# Patient Record
Sex: FEMALE | Race: WHITE | Employment: FULL TIME | URBAN - METROPOLITAN AREA
[De-identification: names, ages, dates, MRNs, and addresses within clinical notes are randomized per-mention and may not be internally consistent; named-entity substitution may affect disease eponyms.]

---

## 2014-07-02 LAB — HIV AG/AB: NONREACTIVE

## 2017-09-11 ENCOUNTER — OFFICE VISIT (OUTPATIENT)
Dept: FAMILY MEDICINE CLINIC | Age: 37
End: 2017-09-11
Payer: COMMERCIAL

## 2017-09-11 VITALS
HEIGHT: 68 IN | HEART RATE: 89 BPM | TEMPERATURE: 97.3 F | BODY MASS INDEX: 42.01 KG/M2 | WEIGHT: 277.2 LBS | DIASTOLIC BLOOD PRESSURE: 76 MMHG | OXYGEN SATURATION: 98 % | SYSTOLIC BLOOD PRESSURE: 110 MMHG

## 2017-09-11 DIAGNOSIS — M62.830 BACK MUSCLE SPASM: Primary | ICD-10-CM

## 2017-09-11 PROCEDURE — G8417 CALC BMI ABV UP PARAM F/U: HCPCS | Performed by: NURSE PRACTITIONER

## 2017-09-11 PROCEDURE — 99213 OFFICE O/P EST LOW 20 MIN: CPT | Performed by: NURSE PRACTITIONER

## 2017-09-11 RX ORDER — CYCLOBENZAPRINE HCL 10 MG
10 TABLET ORAL EVERY 8 HOURS PRN
Qty: 30 TABLET | Refills: 1 | Status: SHIPPED | OUTPATIENT
Start: 2017-09-11 | End: 2017-09-21

## 2017-09-11 ASSESSMENT — ENCOUNTER SYMPTOMS
BOWEL INCONTINENCE: 0
ABDOMINAL PAIN: 0
SHORTNESS OF BREATH: 0
BACK PAIN: 1

## 2017-09-11 ASSESSMENT — PATIENT HEALTH QUESTIONNAIRE - PHQ9
2. FEELING DOWN, DEPRESSED OR HOPELESS: 0
1. LITTLE INTEREST OR PLEASURE IN DOING THINGS: 0
SUM OF ALL RESPONSES TO PHQ9 QUESTIONS 1 & 2: 0
SUM OF ALL RESPONSES TO PHQ QUESTIONS 1-9: 0

## 2017-09-29 ENCOUNTER — OFFICE VISIT (OUTPATIENT)
Dept: FAMILY MEDICINE CLINIC | Age: 37
End: 2017-09-29
Payer: COMMERCIAL

## 2017-09-29 ENCOUNTER — TELEPHONE (OUTPATIENT)
Dept: FAMILY MEDICINE CLINIC | Age: 37
End: 2017-09-29

## 2017-09-29 VITALS
WEIGHT: 277.12 LBS | DIASTOLIC BLOOD PRESSURE: 64 MMHG | SYSTOLIC BLOOD PRESSURE: 120 MMHG | TEMPERATURE: 98.1 F | OXYGEN SATURATION: 98 % | HEART RATE: 96 BPM | HEIGHT: 68 IN | RESPIRATION RATE: 18 BRPM | BODY MASS INDEX: 42 KG/M2

## 2017-09-29 DIAGNOSIS — B96.89 ACUTE BACTERIAL SINUSITIS: Primary | ICD-10-CM

## 2017-09-29 DIAGNOSIS — J01.90 ACUTE BACTERIAL SINUSITIS: Primary | ICD-10-CM

## 2017-09-29 PROCEDURE — 99214 OFFICE O/P EST MOD 30 MIN: CPT | Performed by: NURSE PRACTITIONER

## 2017-09-29 RX ORDER — AMOXICILLIN AND CLAVULANATE POTASSIUM 875; 125 MG/1; MG/1
1 TABLET, FILM COATED ORAL 2 TIMES DAILY
Qty: 20 TABLET | Refills: 0 | Status: SHIPPED | OUTPATIENT
Start: 2017-09-29 | End: 2017-10-09

## 2017-09-29 ASSESSMENT — ENCOUNTER SYMPTOMS
SHORTNESS OF BREATH: 0
COUGH: 1
VOICE CHANGE: 0
EYE REDNESS: 0
SINUS PRESSURE: 0
EYE DISCHARGE: 0
CHEST TIGHTNESS: 0
SORE THROAT: 1
WHEEZING: 0

## 2017-12-08 RX ORDER — FLUTICASONE PROPIONATE 50 MCG
1 SPRAY, SUSPENSION (ML) NASAL DAILY
COMMUNITY
End: 2017-12-08 | Stop reason: SDUPTHER

## 2017-12-08 RX ORDER — FLUTICASONE PROPIONATE 50 MCG
1 SPRAY, SUSPENSION (ML) NASAL DAILY
Qty: 1 BOTTLE | Refills: 2 | Status: SHIPPED | OUTPATIENT
Start: 2017-12-08

## 2018-03-06 ENCOUNTER — OFFICE VISIT (OUTPATIENT)
Dept: FAMILY MEDICINE CLINIC | Age: 38
End: 2018-03-06
Payer: COMMERCIAL

## 2018-03-06 VITALS
TEMPERATURE: 97.6 F | SYSTOLIC BLOOD PRESSURE: 115 MMHG | HEIGHT: 68 IN | DIASTOLIC BLOOD PRESSURE: 83 MMHG | BODY MASS INDEX: 42.89 KG/M2 | HEART RATE: 85 BPM | WEIGHT: 283 LBS | OXYGEN SATURATION: 98 %

## 2018-03-06 DIAGNOSIS — D17.1 LIPOMA OF BACK: Primary | ICD-10-CM

## 2018-03-06 PROCEDURE — 99212 OFFICE O/P EST SF 10 MIN: CPT | Performed by: NURSE PRACTITIONER

## 2018-03-06 ASSESSMENT — ENCOUNTER SYMPTOMS
NAUSEA: 0
BACK PAIN: 1
COLOR CHANGE: 0
VOMITING: 0
COUGH: 0
SHORTNESS OF BREATH: 0

## 2018-03-06 NOTE — PROGRESS NOTES
Visit Information    Have you changed or started any medications since your last visit including any over-the-counter medicines, vitamins, or herbal medicines? no   Have you stopped taking any of your medications? Is so, why? -  no  Are you having any side effects from any of your medications? - no    Have you seen any other physician or provider since your last visit?  no   Have you had any other diagnostic tests since your last visit?  no   Have you been seen in the emergency room and/or had an admission in a hospital since we last saw you?  no   Have you had your routine dental cleaning in the past 6 months?  no     Do you have an active MyChart account? If no, what is the barrier?   Yes    Patient Care Team:  Eriberto Jang CNP as PCP - General (Nurse Practitioner)    Medical History Review  Past Medical, Family, and Social History reviewed and  contribute to the patient presenting condition    Health Maintenance   Topic Date Due    HIV screen  03/29/1996    Flu vaccine (1) 09/13/2018 (Originally 9/1/2017)    DTaP/Tdap/Td vaccine (1 - Tdap) 09/21/2023 (Originally 3/29/2000)    Cervical cancer screen  06/19/2018
myalgias, neck pain and neck stiffness. Skin: Negative for color change, pallor and wound. Neurological: Negative for dizziness, weakness, light-headedness and headaches. Psychiatric/Behavioral: Negative for sleep disturbance. Objective:     Physical Exam   Constitutional: She is oriented to person, place, and time. She appears well-developed and well-nourished. No distress. HENT:   Head: Normocephalic and atraumatic. Neck: Neck supple. Musculoskeletal:        Arms:  Neurological: She is alert and oriented to person, place, and time. Skin: Skin is warm and dry. No erythema. Mobile lipoma to left middle back, no tenderness, erythema or surrounding swelling   Psychiatric: She has a normal mood and affect. Her behavior is normal. Judgment and thought content normal.   Nursing note and vitals reviewed. Assessment:      1. Lipoma of back         Plan:      Return in about 2 weeks (around 3/20/2018) for PAP. Orders Placed This Encounter   Procedures    HIV Screen     This order was created through External Result Entry     Fu for pap  Monitor lipoma and call if pain develops or swelling, redness    Patient given educational materials - see patient instructions. Discussed use, benefit, and side effects of prescribed medications. All patient questions answered. Pt voiced understanding. Reviewed health maintenance. Instructed to continue current medications, diet and exercise.     Electronically signed by Winston Helms CNP on 3/6/2018 at 1:43 PM

## 2018-03-20 ENCOUNTER — HOSPITAL ENCOUNTER (OUTPATIENT)
Age: 38
Setting detail: SPECIMEN
Discharge: HOME | End: 2018-03-20
Payer: COMMERCIAL

## 2018-03-20 ENCOUNTER — OFFICE VISIT (OUTPATIENT)
Dept: FAMILY MEDICINE CLINIC | Age: 38
End: 2018-03-20
Payer: COMMERCIAL

## 2018-03-20 VITALS
HEART RATE: 80 BPM | BODY MASS INDEX: 43.65 KG/M2 | WEIGHT: 288 LBS | SYSTOLIC BLOOD PRESSURE: 120 MMHG | OXYGEN SATURATION: 98 % | HEIGHT: 68 IN | DIASTOLIC BLOOD PRESSURE: 82 MMHG | TEMPERATURE: 97.9 F

## 2018-03-20 DIAGNOSIS — N89.8 VAGINAL IRRITATION: ICD-10-CM

## 2018-03-20 DIAGNOSIS — Z12.4 CERVICAL CANCER SCREENING: ICD-10-CM

## 2018-03-20 DIAGNOSIS — Z12.4 ROUTINE PAPANICOLAOU SMEAR: Primary | ICD-10-CM

## 2018-03-20 LAB
DIRECT EXAM: ABNORMAL
Lab: ABNORMAL
SPECIMEN DESCRIPTION: ABNORMAL
STATUS: ABNORMAL

## 2018-03-20 PROCEDURE — 99395 PREV VISIT EST AGE 18-39: CPT | Performed by: NURSE PRACTITIONER

## 2018-03-20 ASSESSMENT — PATIENT HEALTH QUESTIONNAIRE - PHQ9
SUM OF ALL RESPONSES TO PHQ QUESTIONS 1-9: 0
2. FEELING DOWN, DEPRESSED OR HOPELESS: 0
1. LITTLE INTEREST OR PLEASURE IN DOING THINGS: 0
SUM OF ALL RESPONSES TO PHQ9 QUESTIONS 1 & 2: 0

## 2018-03-20 NOTE — PROGRESS NOTES
and atraumatic  Neck: supple and non-tender without mass, no thyromegaly or thyroid nodules, no cervical lymphadenopathy  Pulmonary/Chest: clear to auscultation bilaterally- no wheezes, rales or rhonchi, normal air movement, no respiratory distress  Cardiovascular: normal rate, regular rhythm, normal S1 and S2, no murmurs, rubs, clicks, or gallops  Abdomen: soft, non-tender, non-distended, normal bowel sounds, no masses or organomegaly  Pelvic: normal external genitalia, vulva, vagina, cervix, uterus and adnexa. No CMT. no lesions. White clumpy vaginal discharge. No masses noted. Breast: appear normal, no suspicious masses, no skin or nipple changes or axillary nodes bilaterally. Psych: pleasant, cooperative          Assessment:    annual pap exam  1. Routine Papanicolaou smear  PAP Smear   2. Cervical cancer screening  PAP Smear       Plan:      Return in about 1 year (around 3/20/2019) for routine healthcare visit. Orders Placed This Encounter   Procedures    PAP Smear     Patient History:    Patient's last menstrual period was 2018 (approximate). OBGYN Status: Having periods  Past Surgical History:  No date:  SECTION      Comment: x2  No date: OTHER SURGICAL HISTORY      Comment: mole removal      Smoking status: Never Smoker                                                              Smokeless tobacco: Never Used                           Standing Status:   Future     Standing Expiration Date:   3/20/2019     Order Specific Question:   Collection Type     Answer: Thin Prep     Order Specific Question:   Prior Abnormal Pap Test     Answer:   No     Order Specific Question:   Screening or Diagnostic     Answer:   Screening     Order Specific Question:   HPV Requested? Answer:   Yes     Order Specific Question:   High Risk Patient     Answer:   N/A     Declines mamm  Will call with test results  The nature of sun-induced photo-aging and skin cancers is discussed.   Sun avoidance, protective clothing, and the use of 30-SPF sunscreens is advised. Observe closely for skin damage/changes, and call if such occurs. Patient given educational materials - see patient instructions. Discussed use, benefit, and side effects of prescribed medications. All patient questions answered. Pt voiced understanding. Reviewed health maintenance. Instructed to continue current medications, diet and exercise. Patient agreed with treatment plan. Follow up as directed below.      Electronically signed by RIP Romero on 3/20/2018 at 9:42 AM

## 2018-03-21 DIAGNOSIS — N76.0 BV (BACTERIAL VAGINOSIS): Primary | ICD-10-CM

## 2018-03-21 DIAGNOSIS — B96.89 BV (BACTERIAL VAGINOSIS): Primary | ICD-10-CM

## 2018-03-21 LAB
HPV SAMPLE: NORMAL
HPV SOURCE: NORMAL
HPV, GENOTYPE 16: NOT DETECTED
HPV, GENOTYPE 18: NOT DETECTED
HPV, HIGH RISK OTHER: NOT DETECTED
HPV, INTERPRETATION: NORMAL

## 2018-03-21 RX ORDER — METRONIDAZOLE 500 MG/1
500 TABLET ORAL 2 TIMES DAILY
Qty: 14 TABLET | Refills: 0 | Status: SHIPPED | OUTPATIENT
Start: 2018-03-21 | End: 2018-03-28

## 2018-03-27 ENCOUNTER — TELEPHONE (OUTPATIENT)
Dept: FAMILY MEDICINE CLINIC | Age: 38
End: 2018-03-27

## 2018-04-03 LAB — CYTOLOGY REPORT: NORMAL

## 2018-11-28 ENCOUNTER — OFFICE VISIT (OUTPATIENT)
Dept: FAMILY MEDICINE CLINIC | Age: 38
End: 2018-11-28
Payer: COMMERCIAL

## 2018-11-28 VITALS
DIASTOLIC BLOOD PRESSURE: 82 MMHG | WEIGHT: 292 LBS | HEIGHT: 68 IN | BODY MASS INDEX: 44.25 KG/M2 | TEMPERATURE: 97.9 F | HEART RATE: 88 BPM | SYSTOLIC BLOOD PRESSURE: 117 MMHG | OXYGEN SATURATION: 98 %

## 2018-11-28 DIAGNOSIS — Z91.09 FRAGRANCE HYPERSENSITIVITY: ICD-10-CM

## 2018-11-28 DIAGNOSIS — R07.89 CHEST TIGHTNESS: ICD-10-CM

## 2018-11-28 DIAGNOSIS — Z23 NEED FOR INFLUENZA VACCINATION: ICD-10-CM

## 2018-11-28 DIAGNOSIS — R09.89 THROAT TIGHTNESS: Primary | ICD-10-CM

## 2018-11-28 PROCEDURE — 90472 IMMUNIZATION ADMIN EACH ADD: CPT | Performed by: NURSE PRACTITIONER

## 2018-11-28 PROCEDURE — 90688 IIV4 VACCINE SPLT 0.5 ML IM: CPT | Performed by: NURSE PRACTITIONER

## 2018-11-28 PROCEDURE — 90471 IMMUNIZATION ADMIN: CPT | Performed by: NURSE PRACTITIONER

## 2018-11-28 PROCEDURE — 99213 OFFICE O/P EST LOW 20 MIN: CPT | Performed by: NURSE PRACTITIONER

## 2018-11-28 RX ORDER — ALBUTEROL SULFATE 90 UG/1
1-2 AEROSOL, METERED RESPIRATORY (INHALATION) 4 TIMES DAILY PRN
Qty: 1 INHALER | Refills: 0 | Status: SHIPPED | OUTPATIENT
Start: 2018-11-28 | End: 2019-07-20 | Stop reason: SDUPTHER

## 2018-11-28 ASSESSMENT — ENCOUNTER SYMPTOMS
RHINORRHEA: 0
SINUS PRESSURE: 0
TROUBLE SWALLOWING: 1
SORE THROAT: 0
CHEST TIGHTNESS: 1
COUGH: 0
ABDOMINAL PAIN: 0
SHORTNESS OF BREATH: 1

## 2018-11-28 NOTE — PROGRESS NOTES
maxillary sinus tenderness and no frontal sinus tenderness. Left sinus exhibits no maxillary sinus tenderness and no frontal sinus tenderness. Mouth/Throat: Uvula is midline, oropharynx is clear and moist and mucous membranes are normal. No oropharyngeal exudate. Neck: Normal range of motion. Neck supple. Cardiovascular: Normal rate, regular rhythm and normal heart sounds. Pulmonary/Chest: Effort normal and breath sounds normal. No respiratory distress. She has no wheezes. Lymphadenopathy:     She has no cervical adenopathy. Neurological: She is alert and oriented to person, place, and time. Skin: Skin is warm and dry. Capillary refill takes less than 2 seconds. No rash noted. She is not diaphoretic. Psychiatric: She has a normal mood and affect. Her behavior is normal. Judgment and thought content normal.   Nursing note and vitals reviewed. Assessment:       Diagnosis Orders   1. Throat tightness     2. Chest tightness  albuterol sulfate  (90 Base) MCG/ACT inhaler   3. Fragrance hypersensitivity  albuterol sulfate  (90 Base) MCG/ACT inhaler   4. Need for influenza vaccination  INFLUENZA, QUADV, 3 YRS AND OLDER, IM, MDV, 0.5ML ([place])       Plan:      Return if symptoms worsen or fail to improve. Orders Placed This Encounter   Procedures    INFLUENZA, QUADV, 3 YRS AND OLDER, IM, MDV, 0.5ML (FLUZONE QUADV)     Orders Placed This Encounter   Medications    albuterol sulfate  (90 Base) MCG/ACT inhaler     Sig: Inhale 1-2 puffs into the lungs 4 times daily as needed for Shortness of Breath     Dispense:  1 Inhaler     Refill:  0     Discussed all tx options IE, steroid inhaler, change of allergy med, albuterol inhaler, singulair and pt decided to trial albuterol first since it is as needed and can use more than daily if needed  She will seek allergist appt after new years. Flu vaccine given    Patient given educational materials - see patient instructions.

## 2019-03-11 ENCOUNTER — TELEPHONE (OUTPATIENT)
Dept: FAMILY MEDICINE CLINIC | Age: 39
End: 2019-03-11

## 2019-06-17 ENCOUNTER — OFFICE VISIT (OUTPATIENT)
Dept: FAMILY MEDICINE CLINIC | Age: 39
End: 2019-06-17
Payer: COMMERCIAL

## 2019-06-17 VITALS
SYSTOLIC BLOOD PRESSURE: 106 MMHG | HEART RATE: 91 BPM | DIASTOLIC BLOOD PRESSURE: 76 MMHG | TEMPERATURE: 98.7 F | OXYGEN SATURATION: 98 %

## 2019-06-17 DIAGNOSIS — J06.9 VIRAL URI: Primary | ICD-10-CM

## 2019-06-17 PROCEDURE — 99213 OFFICE O/P EST LOW 20 MIN: CPT | Performed by: NURSE PRACTITIONER

## 2019-06-17 RX ORDER — AMOXICILLIN 875 MG/1
875 TABLET, COATED ORAL 2 TIMES DAILY
Qty: 20 TABLET | Refills: 0 | Status: SHIPPED | OUTPATIENT
Start: 2019-06-17 | End: 2019-06-27

## 2019-06-17 ASSESSMENT — ENCOUNTER SYMPTOMS
VOMITING: 0
RHINORRHEA: 1
VOICE CHANGE: 1
SORE THROAT: 1
DIARRHEA: 0
COUGH: 1

## 2019-06-17 ASSESSMENT — PATIENT HEALTH QUESTIONNAIRE - PHQ9
2. FEELING DOWN, DEPRESSED OR HOPELESS: 0
SUM OF ALL RESPONSES TO PHQ QUESTIONS 1-9: 0
SUM OF ALL RESPONSES TO PHQ QUESTIONS 1-9: 0
1. LITTLE INTEREST OR PLEASURE IN DOING THINGS: 0
SUM OF ALL RESPONSES TO PHQ9 QUESTIONS 1 & 2: 0

## 2019-06-17 NOTE — PATIENT INSTRUCTIONS
Good handwashing  Increase fluids  Continue flonase  Add claritin or zyrtec  Fill amoxicillin if not better or if worse later in week  Gargle warm salt water  Motrin every 8 hours as directed   Tylenol every 6 hours as directed  Return for worsening, change or concern  Follow up as directed  Patient Education        Upper Respiratory Infection (Cold): Care Instructions  Your Care Instructions    An upper respiratory infection, or URI, is an infection of the nose, sinuses, or throat. URIs are spread by coughs, sneezes, and direct contact. The common cold is the most frequent kind of URI. The flu and sinus infections are other kinds of URIs. Almost all URIs are caused by viruses. Antibiotics won't cure them. But you can treat most infections with home care. This may include drinking lots of fluids and taking over-the-counter pain medicine. You will probably feel better in 4 to 10 days. The doctor has checked you carefully, but problems can develop later. If you notice any problems or new symptoms, get medical treatment right away. Follow-up care is a key part of your treatment and safety. Be sure to make and go to all appointments, and call your doctor if you are having problems. It's also a good idea to know your test results and keep a list of the medicines you take. How can you care for yourself at home? · To prevent dehydration, drink plenty of fluids, enough so that your urine is light yellow or clear like water. Choose water and other caffeine-free clear liquids until you feel better. If you have kidney, heart, or liver disease and have to limit fluids, talk with your doctor before you increase the amount of fluids you drink. · Take an over-the-counter pain medicine, such as acetaminophen (Tylenol), ibuprofen (Advil, Motrin), or naproxen (Aleve). Read and follow all instructions on the label. · Before you use cough and cold medicines, check the label.  These medicines may not be safe for young children or

## 2019-06-17 NOTE — PROGRESS NOTES
[address] [facility] [address]  Dept: [phone]  Dept Fax: [phone]    Brandt Sargent a 46 y.o. female who presents to the urgent care today for her medical conditions/complaintsas noted below. Laureen Granda is c/o of Pharyngitis (ears feels clogged, post nasal drip - started 2 days ago)      HPI:     Cough 2 days, nasal congestion, sore throat from drainage  In musical this weekend  Son has uri  States drainage Is affecting her voice    URI    This is a new problem. Episode onset: 2 days. The problem has been unchanged. There has been no fever. Associated symptoms include congestion, coughing, rhinorrhea and a sore throat. Pertinent negatives include no diarrhea or vomiting. Treatments tried: mucinex and flonase. The treatment provided mild relief. Past Medical History:   Diagnosis Date    Back muscle spasm 9/11/2017    BV (bacterial vaginosis) 3/21/2018       Current Outpatient Medications   Medication Sig Dispense Refill    amoxicillin (AMOXIL) 875 MG tablet Take 1 tablet by mouth 2 times daily for 10 days 20 tablet 0    albuterol sulfate  (90 Base) MCG/ACT inhaler Inhale 1-2 puffs into the lungs 4 times daily as needed for Shortness of Breath 1 Inhaler 0    fluticasone (FLONASE) 50 MCG/ACT nasal spray 1 spray by Nasal route daily 1 Bottle 2     No current facility-administered medications for this visit. Allergies   Allergen Reactions    Flagyl [Metronidazole]        Subjective:     Review of Systems   Constitutional: Negative for fever. HENT: Positive for congestion, rhinorrhea, sore throat and voice change. Respiratory: Positive for cough. Gastrointestinal: Negative for diarrhea and vomiting. All other systems reviewed and are negative. Objective:      Physical Exam   Constitutional: She is oriented to person, place, and time.  Vital signs are normal. She appears well-developed and well-nourished. No distress. HENT:   Head: Normocephalic and atraumatic. Right Ear: Tympanic membrane normal.   Left Ear: Tympanic membrane normal.   Nose: Mucosal edema present. Mouth/Throat: Uvula is midline and mucous membranes are normal. No trismus in the jaw. No uvula swelling. Posterior oropharyngeal erythema present. No oropharyngeal exudate. Postnasal drainage in throat   Eyes: Pupils are equal, round, and reactive to light. Conjunctivae are normal. Right eye exhibits no discharge. Left eye exhibits no discharge. No scleral icterus. Neck: Normal range of motion. Neck supple. Cardiovascular: Normal rate, regular rhythm and normal heart sounds. No murmur heard. Pulmonary/Chest: Effort normal and breath sounds normal. No stridor. No respiratory distress. She has no wheezes. She has no rales. Abdominal: Soft. Bowel sounds are normal. There is no tenderness. Musculoskeletal: Normal range of motion. She exhibits no edema. No edema. Normal gait in pham   Lymphadenopathy:     She has no cervical adenopathy. Neurological: She is alert and oriented to person, place, and time. No cranial nerve deficit. Skin: Skin is warm and dry. No rash noted. She is not diaphoretic. Psychiatric: She has a normal mood and affect. Her behavior is normal.   Nursing note and vitals reviewed. /76 (Site: Left Upper Arm, Position: Sitting, Cuff Size: Large Adult)   Pulse 91   Temp 98.7 °F (37.1 °C) (Oral)   LMP 06/14/2019 (Exact Date)   SpO2 98%     Assessment:          Diagnosis Orders   1.  Viral URI  amoxicillin (AMOXIL) 875 MG tablet       Plan:    Good handwashing  Increase fluids  Continue flonase  Add claritin or zyrtec  Fill amoxicillin if not better or if worse later in week  Gargle warm salt water  Motrin every 8 hours as directed   Tylenol every 6 hours as directed  Return for worsening, change or concern  Follow up as directed  Return for follow up with primary care in 7 days, worsening, change or concern. Orders Placed This Encounter   Medications    amoxicillin (AMOXIL) 875 MG tablet     Sig: Take 1 tablet by mouth 2 times daily for 10 days     Dispense:  20 tablet     Refill:  0         Patient given educational materials - see patientinstructions. Discussed use, benefit, and side effects of prescribed medications. All patient questions answered. Pt voiced understanding.     Electronically signed by SUMMER Frye 6/17/2019 at 10:01 AM

## 2019-07-20 DIAGNOSIS — R07.89 CHEST TIGHTNESS: ICD-10-CM

## 2019-07-20 DIAGNOSIS — Z91.09 FRAGRANCE HYPERSENSITIVITY: ICD-10-CM

## 2019-07-21 RX ORDER — ALBUTEROL SULFATE 90 UG/1
1-2 AEROSOL, METERED RESPIRATORY (INHALATION) 4 TIMES DAILY PRN
Qty: 1 INHALER | Refills: 0 | Status: SHIPPED | OUTPATIENT
Start: 2019-07-21

## 2019-12-23 ENCOUNTER — OFFICE VISIT (OUTPATIENT)
Dept: FAMILY MEDICINE CLINIC | Age: 39
End: 2019-12-23
Payer: COMMERCIAL

## 2019-12-23 VITALS
SYSTOLIC BLOOD PRESSURE: 129 MMHG | TEMPERATURE: 98 F | HEIGHT: 68 IN | DIASTOLIC BLOOD PRESSURE: 84 MMHG | BODY MASS INDEX: 44.4 KG/M2 | HEART RATE: 93 BPM | OXYGEN SATURATION: 97 %

## 2019-12-23 DIAGNOSIS — M79.671 RIGHT FOOT PAIN: Primary | ICD-10-CM

## 2019-12-23 PROCEDURE — 99214 OFFICE O/P EST MOD 30 MIN: CPT | Performed by: NURSE PRACTITIONER

## 2019-12-23 ASSESSMENT — ENCOUNTER SYMPTOMS
COUGH: 0
COLOR CHANGE: 0
SHORTNESS OF BREATH: 0
NAUSEA: 0
VOMITING: 0

## 2020-09-28 ENCOUNTER — PATIENT MESSAGE (OUTPATIENT)
Dept: FAMILY MEDICINE CLINIC | Age: 40
End: 2020-09-28

## 2020-09-28 ENCOUNTER — OFFICE VISIT (OUTPATIENT)
Dept: FAMILY MEDICINE CLINIC | Age: 40
End: 2020-09-28
Payer: COMMERCIAL

## 2020-09-28 VITALS
HEIGHT: 68 IN | SYSTOLIC BLOOD PRESSURE: 116 MMHG | HEART RATE: 88 BPM | TEMPERATURE: 97.9 F | DIASTOLIC BLOOD PRESSURE: 84 MMHG | WEIGHT: 293 LBS | BODY MASS INDEX: 44.41 KG/M2 | OXYGEN SATURATION: 98 %

## 2020-09-28 PROCEDURE — 90471 IMMUNIZATION ADMIN: CPT | Performed by: NURSE PRACTITIONER

## 2020-09-28 PROCEDURE — 99214 OFFICE O/P EST MOD 30 MIN: CPT | Performed by: NURSE PRACTITIONER

## 2020-09-28 PROCEDURE — 90686 IIV4 VACC NO PRSV 0.5 ML IM: CPT | Performed by: NURSE PRACTITIONER

## 2020-09-28 PROCEDURE — G8417 CALC BMI ABV UP PARAM F/U: HCPCS | Performed by: NURSE PRACTITIONER

## 2020-09-28 RX ORDER — ALPRAZOLAM 0.5 MG/1
0.5 TABLET ORAL 3 TIMES DAILY PRN
Qty: 30 TABLET | Refills: 0 | Status: SHIPPED | OUTPATIENT
Start: 2020-09-28 | End: 2020-10-28

## 2020-09-28 RX ORDER — DEXAMETHASONE 0.5 MG/1
1 TABLET ORAL ONCE
Qty: 2 TABLET | Refills: 0 | Status: SHIPPED | OUTPATIENT
Start: 2020-09-28 | End: 2020-09-28

## 2020-09-28 ASSESSMENT — ENCOUNTER SYMPTOMS
VOMITING: 0
SHORTNESS OF BREATH: 0
CHEST TIGHTNESS: 0
ABDOMINAL PAIN: 0
COUGH: 0
NAUSEA: 0

## 2020-09-28 ASSESSMENT — PATIENT HEALTH QUESTIONNAIRE - PHQ9
1. LITTLE INTEREST OR PLEASURE IN DOING THINGS: 0
SUM OF ALL RESPONSES TO PHQ QUESTIONS 1-9: 0
SUM OF ALL RESPONSES TO PHQ QUESTIONS 1-9: 0
2. FEELING DOWN, DEPRESSED OR HOPELESS: 0
SUM OF ALL RESPONSES TO PHQ9 QUESTIONS 1 & 2: 0

## 2020-09-28 NOTE — PROGRESS NOTES
Inhale 1-2 puffs into the lungs 4 times daily as needed for Shortness of Breath 1 Inhaler 0     No current facility-administered medications for this visit. Allergies   Allergen Reactions    Flagyl [Metronidazole]        Health Maintenance   Topic Date Due    Varicella vaccine (1 of 2 - 2-dose childhood series) 03/29/1982    Flu vaccine (1) 09/01/2020    Cervical cancer screen  03/20/2023    DTaP/Tdap/Td vaccine (2 - Td) 07/02/2024    HIV screen  Completed    Hepatitis A vaccine  Aged Out    Hepatitis B vaccine  Aged Out    Hib vaccine  Aged Out    Meningococcal (ACWY) vaccine  Aged Out    Pneumococcal 0-64 years Vaccine  Aged Out       Subjective:      Review of Systems   Constitutional: Positive for unexpected weight change. Negative for appetite change, chills, diaphoresis, fatigue and fever. Eyes: Negative for visual disturbance. Respiratory: Negative for cough, chest tightness and shortness of breath. Cardiovascular: Negative for chest pain, palpitations and leg swelling. Gastrointestinal: Negative for abdominal pain, nausea and vomiting. Endocrine: Negative for cold intolerance, heat intolerance, polydipsia, polyphagia and polyuria. Genitourinary: Negative for menstrual problem. Musculoskeletal: Negative for arthralgias and myalgias. Skin: Negative for rash. Neurological: Negative for dizziness, weakness, light-headedness and headaches. Hematological: Negative for adenopathy. Psychiatric/Behavioral: Positive for sleep disturbance. Negative for behavioral problems, confusion, decreased concentration and dysphoric mood. The patient is nervous/anxious. Objective:      Physical Exam  Vitals signs and nursing note reviewed. Constitutional:       General: She is not in acute distress. Appearance: Normal appearance. She is well-developed. She is obese. She is not ill-appearing or diaphoretic. HENT:      Head: Normocephalic and atraumatic.    Neck: Musculoskeletal: Neck supple. Cardiovascular:      Rate and Rhythm: Normal rate and regular rhythm. Heart sounds: Normal heart sounds. Comments: No LE edema  Pulmonary:      Effort: Pulmonary effort is normal.      Breath sounds: Normal breath sounds. Skin:     General: Skin is warm and dry. Coloration: Skin is not pale. Findings: No erythema or rash. Neurological:      General: No focal deficit present. Mental Status: She is alert and oriented to person, place, and time. Motor: No weakness. Gait: Gait normal.   Psychiatric:         Mood and Affect: Mood normal.         Behavior: Behavior normal.         Thought Content: Thought content normal.         Judgment: Judgment normal.         Assessment:       Diagnosis Orders   1. Situational anxiety  DEXAMETHASONE    Cortisol Total    dexamethasone (DECADRON) 0.5 MG tablet    ALPRAZolam (XANAX) 0.5 MG tablet   2. Flu vaccine need  INFLUENZA, QUADV, 3 YRS AND OLDER, IM PF, PREFILL SYR OR SDV, 0.5ML (AFLURIA QUADV, PF)   3. Abnormal weight gain  TSH without Reflex    T4, Free    Thyroid Antibodies    Vitamin D 25 Hydroxy    DEXAMETHASONE    Comprehensive Metabolic Panel    CBC Auto Differential    dexamethasone (DECADRON) 0.5 MG tablet   4. Morbid obesity with BMI of 45.0-49.9, adult (HCC)  TSH without Reflex    T4, Free    Thyroid Antibodies    Vitamin D 25 Hydroxy    DEXAMETHASONE    Cortisol Total    dexamethasone (DECADRON) 0.5 MG tablet    AL CALC BMI ABV UP DENISSE F/U     Wt Readings from Last 3 Encounters:   09/28/20 (!) 310 lb 9.6 oz (140.9 kg)   11/28/18 292 lb (132.5 kg)   03/20/18 288 lb (130.6 kg)     BP Readings from Last 3 Encounters:   09/28/20 116/84   12/23/19 129/84   06/17/19 106/76         Plan:      Return if symptoms worsen or fail to improve.   Orders Placed This Encounter   Procedures    INFLUENZA, QUADV, 3 YRS AND OLDER, IM PF, PREFILL SYR OR SDV, 0.5ML (AFLURIA QUADV, PF)    TSH without Reflex     Standing damage/changes, and call if such occurs. Denies sleep apnea symptoms    Patient given educational materials - see patient instructions. Discussed use,benefit, and side effects of prescribed medications. All patient questions answered. Pt voiced understanding. Reviewed health maintenance. Instructed to continue currentmedications, diet and exercise.     Electronically signed by Renetta Helms CNP on 9/28/2020 at 12:16 PM

## 2020-09-28 NOTE — PROGRESS NOTES
Visit Information    Have you changed or started any medications since your last visit including any over-the-counter medicines, vitamins, or herbal medicines? no   Have you stopped taking any of your medications? Is so, why? -  no  Are you having any side effects from any of your medications? - no    Have you seen any other physician or provider since your last visit?  no   Have you had any other diagnostic tests since your last visit?  no   Have you been seen in the emergency room and/or had an admission in a hospital since we last saw you?  no   Have you had your routine dental cleaning in the past 6 months?  no     Do you have an active MyChart account? If no, what is the barrier? Yes    Patient Care Team:  SUMMER Hanson CNP as PCP - General (Nurse Practitioner)  SUMMER Hanson CNP [facility] PCP [facility]    Medical History Review  Past Medical, Family, and Social History reviewed and  contribute to the patient presenting condition    Health Maintenance   Topic Date Due    Varicella vaccine (1 of 2 - 2-dose childhood series) 03/29/1982    Flu vaccine (1) 09/01/2020    Cervical cancer screen  03/20/2023    DTaP/Tdap/Td vaccine (2 - Td) 07/02/2024    HIV screen  Completed    Hepatitis A vaccine  Aged Out    Hepatitis B vaccine  Aged Out    Hib vaccine  Aged Out    Meningococcal (ACWY) vaccine  Aged Out    Pneumococcal 0-64 years Vaccine  Aged Out     After obtaining consent, and per orders of Sonya Pearson CNP, injection of flu vaccine given in Left deltoid by Corina Hernandez. Patient instructed to remain in clinic for 20 minutes afterwards, and to report any adverse reaction to me immediately. Vaccine Information Sheet, \"Influenza - Inactivated\"  given to Vaishnavi Garcia, or parent/legal guardian of  Vaishnavi Garcia and verbalized understanding. Patient responses:    Have you ever had a reaction to a flu vaccine? No  Are you able to eat eggs without adverse effects?   No  Do you have any current illness? No  Have you ever had [place] Syndrome? No    Flu vaccine given per order. Please see immunization tab.

## 2020-09-28 NOTE — TELEPHONE ENCOUNTER
From: Woodrow Mcgee  To: Marylou Hurst, APRN - CNP  Sent: 9/28/2020 6:16 AM EDT  Subject: Prescription Question    Hi. I scheduled an appointment for today at 12:00, but I don't know that I actually need to be seen. I am hoping to get a new prescription for Xanax. I filled my last one in 2015 (2 pills still left) and would like to get them again. You have offered in past appointments, but I didn't feel the need at the time. The [employer] and my 's recent job loss are really getting to me. My main times I feel I need to use the Xanax is the holidays and travel. Both are coming up soon. I have been working with a therapist Dr. Chayo Appiah who agreed that I don't need something for every day, but just on occasion. I would prefer to not have to come in person today, but if you need me to, I understand. Thank you [phone].

## 2020-12-01 ENCOUNTER — PATIENT MESSAGE (OUTPATIENT)
Dept: FAMILY MEDICINE CLINIC | Age: 40
End: 2020-12-01

## 2020-12-01 RX ORDER — CITALOPRAM 20 MG/1
20 TABLET ORAL DAILY
Qty: 90 TABLET | Refills: 1 | Status: SHIPPED
Start: 2020-12-01 | End: 2021-05-14 | Stop reason: DRUGHIGH

## 2020-12-01 NOTE — TELEPHONE ENCOUNTER
From: Tatiana Samuels  To: SUMMER Hdez CNP  Sent: 12/1/2020 11:21 AM EST  Subject: Prescription Question    I don't know what the dose was, but it was prescribed originally by Dr. Westley Rodriguez at your office. Could it be in my file there still ? If you are able to see that, bear in mind, I weighed much less then. So, not really sure. Please send to [facility] in [place]. Thanks!      ----- Message -----   SUMMER Chau CNP   Sent:12/1/2020 9:57 AM EST   To:Nicolette Perkins   Subject:RE: Prescription Question    I totally understand and will send that over for you! Do you know which dose you were on 10, 20 or 40? Or have a preference, let me know where to send also! Darryl Weir in there!  myrtle      ----- Message -----   From:Nicolette Perkins   Sent:12/1/2020 8:19 AM EST   To:SUMMER Langston CNP   Subject:Prescription Question    Adry Adams,    I came in recently for a new Xanax prescription. It has been a help, but I believe I need something at this point for daily use. I have used the Xanax about 2-3 times a week (usually only half a pill does the trick), but I am struggling. Due to the loss of our insurance and having to pay out of pocket because of a high deductible plan, I have not been currently seeing Dr. Antonio Vuong. I am in tears daily, and I don't really know why. I have an amazing  who loves me, great kids, supportive family, and enough money to pay the bills until my  finds a new job. In 2009, after my divorce, I was on Citalopram for a couple months, and it really made the difference I am looking for right now. Please advise.

## 2021-02-08 ENCOUNTER — TELEPHONE (OUTPATIENT)
Dept: FAMILY MEDICINE CLINIC | Age: 41
End: 2021-02-08

## 2021-02-08 ENCOUNTER — PATIENT MESSAGE (OUTPATIENT)
Dept: FAMILY MEDICINE CLINIC | Age: 41
End: 2021-02-08

## 2021-02-08 NOTE — LETTER
[address] [facility] [address] [address]  Phone: [phone]  Fax: SUMMER Adorno CNP        February 8, 2021     Patient: Marie Meng   YOB: 1980   Date of Visit: 2/8/2021       To Whom It May Concern: It is my medical opinion that Marie Meng may return to work on 02/09/2021 with no restrictions. If you have any questions or concerns, please don't hesitate to call.     Sincerely,        Cristopher Schlatter, APRN - CNP

## 2021-02-08 NOTE — TELEPHONE ENCOUNTER
From: Mannie Claudio  To: Cielo Saini APRN - CNP  Sent: 2/8/2021 8:59 AM EST  Subject: Test Results Question    Hello. I have had to be off work due to a possible COVID exposure. I was tested last Thursday at the Urgent [facility] in [place] and my negative results are attached to this message. My boss says a copy of the test results is not enough to go back to work. She said I have to have a doctor's note. Are you able to provide that based on the attached negative results?     Thank you,    Mannie Claudio  [phone]

## 2021-05-14 ENCOUNTER — PATIENT MESSAGE (OUTPATIENT)
Dept: FAMILY MEDICINE CLINIC | Age: 41
End: 2021-05-14

## 2021-05-14 RX ORDER — CITALOPRAM 10 MG/1
10 TABLET ORAL DAILY
Qty: 30 TABLET | Refills: 3 | Status: SHIPPED | OUTPATIENT
Start: 2021-05-14 | End: 2021-10-25 | Stop reason: SDUPTHER

## 2021-12-10 ENCOUNTER — TELEMEDICINE (OUTPATIENT)
Dept: FAMILY MEDICINE CLINIC | Age: 41
End: 2021-12-10

## 2021-12-10 DIAGNOSIS — F41.9 ANXIETY: Primary | ICD-10-CM

## 2021-12-10 PROCEDURE — 99213 OFFICE O/P EST LOW 20 MIN: CPT | Performed by: NURSE PRACTITIONER

## 2021-12-10 RX ORDER — CITALOPRAM 20 MG/1
20 TABLET ORAL DAILY
Qty: 90 TABLET | Refills: 3 | Status: SHIPPED | OUTPATIENT
Start: 2021-12-10

## 2021-12-10 ASSESSMENT — PATIENT HEALTH QUESTIONNAIRE - PHQ9
SUM OF ALL RESPONSES TO PHQ QUESTIONS 1-9: 0
SUM OF ALL RESPONSES TO PHQ QUESTIONS 1-9: 0
1. LITTLE INTEREST OR PLEASURE IN DOING THINGS: 0
2. FEELING DOWN, DEPRESSED OR HOPELESS: 0
SUM OF ALL RESPONSES TO PHQ QUESTIONS 1-9: 0
SUM OF ALL RESPONSES TO PHQ9 QUESTIONS 1 & 2: 0

## 2021-12-10 NOTE — PROGRESS NOTES
Visit Information    Have you changed or started any medications since your last visit including any over-the-counter medicines, vitamins, or herbal medicines? no   Have you stopped taking any of your medications? Is so, why? -  no  Are you having any side effects from any of your medications? - no    Have you seen any other physician or provider since your last visit?  no   Have you had any other diagnostic tests since your last visit?  no   Have you been seen in the emergency room and/or had an admission in a hospital since we last saw you?  no   Have you had your routine dental cleaning in the past 6 months?  no     Do you have an active MyChart account? If no, what is the barrier?   Yes    Patient Care Team:  SUMMER Rosa CNP as PCP - General (Nurse Practitioner)  SUMMER Rosa CNP as PCP - [facility] Provider    Medical History Review  Past Medical, Family, and Social History reviewed and  contribute to the patient presenting condition    Health Maintenance   Topic Date Due    Hepatitis C screen  Never done    Varicella vaccine (1 of 2 - 2-dose childhood series) Never done    COVID-19 Vaccine (1) Never done    Lipid screen  03/29/2021    Flu vaccine (1) 09/01/2021    Cervical cancer screen  03/20/2023    DTaP/Tdap/Td vaccine (2 - Td or Tdap) 07/02/2024    HIV screen  Completed    Hepatitis A vaccine  Aged Out    Hepatitis B vaccine  Aged Out    Hib vaccine  Aged Out    Meningococcal (ACWY) vaccine  Aged Out    Pneumococcal 0-64 years Vaccine  Aged Out

## 2021-12-10 NOTE — PROGRESS NOTES
Marquis Peralta (:  1980) is a 37 y.o. female,Established patient, here for evaluation of the following chief complaint(s): Anxiety         ASSESSMENT/PLAN:  1. Anxiety  Will restart her back on 20 mg of Celexa p.o. hs  Continue with weight loss challenge and regular exercise  Okay for counselor if desired  Call if not better or worsening  Okay for any vaccines due at pharmacy    Return in about 1 year (around 12/10/2022) for routine healthcare visit. SUBJECTIVE/OBJECTIVE:  HPI  Patient calling in today for med check and refill. States she has struggled with wanting to be on long-term anxiety medicine Celexa but has decided at this point that she needs to stay on this medication long-term. She also recently found out that her mother and her maternal grandfather also take anxiety medicine long-term. She states she feels irritable crying often when she does not take this medication. She would like to go back to the 20 mg dose she will better at this dose. She is working on weight loss and feeling really good otherwise. Denies depression, SI, HI, chest pain, shortness of breath.     Review of Systems    Patient-Reported Vitals 12/10/2021   Patient-Reported Height 5'8.11        Physical Exam    [INSTRUCTIONS:  \"[x]\" Indicates a positive item  \"[]\" Indicates a negative item  -- DELETE ALL ITEMS NOT EXAMINED]    Constitutional: [x] Appears well-developed and well-nourished [x] No apparent distress      [] Abnormal -     Mental status: [x] Alert and awake  [x] Oriented to person/place/time [x] Able to follow commands    [] Abnormal -     Eyes:   EOM    [x]  Normal    [] Abnormal -   Sclera  [x]  Normal    [] Abnormal -          Discharge [x]  None visible   [] Abnormal -     HENT: [x] Normocephalic, atraumatic  [] Abnormal -   [x] Mouth/Throat: Mucous membranes are moist    External Ears [x] Normal  [] Abnormal -    Neck: [x] No visualized mass [] Abnormal -     Pulmonary/Chest: [x] Respiratory effort normal   [x] No visualized signs of difficulty breathing or respiratory distress        [] Abnormal -      Musculoskeletal:   [x] Normal gait with no signs of ataxia         [x] Normal range of motion of neck        [] Abnormal -     Neurological:        [x] No Facial Asymmetry (Cranial nerve 7 motor function) (limited exam due to video visit)          [x] No gaze palsy        [] Abnormal -          Skin:        [x] No significant exanthematous lesions or discoloration noted on facial skin         [] Abnormal -            Psychiatric:       [x] Normal Affect [] Abnormal -        [x] No Hallucinations    Other pertinent observable physical exam findings:-      Remedios Hernandez, was evaluated through a synchronous (real-time) audio-video encounter. The patient (or guardian if applicable) is aware that this is a billable service. Verbal consent to proceed has been obtained within the past 12 months. The visit was conducted pursuant to the emergency declaration under the [address] [place] [address], [address] [place] [address] authority and the [employer] and [employer] Act. Patient identification was verified, and a caregiver was present when appropriate. The patient was located in a state where the provider was credentialed to provide care. An electronic signature was used to authenticate this note.     --Cirilo Ugalde, APRQUYEN - CNP

## 2022-02-10 ENCOUNTER — TELEPHONE (OUTPATIENT)
Dept: FAMILY MEDICINE CLINIC | Age: 42
End: 2022-02-10

## 2022-02-10 NOTE — TELEPHONE ENCOUNTER
----- Message from [facility] sent at 2/10/2022 10:00 AM EST -----  Subject: Message to Provider    QUESTIONS  Information for Provider? Patient wants to know from [facility] office   how much is an office visit for her follow up appointment she had on   12-10-21. Patient said she already spoke with billing and they told her to   call her provider office to get the cost of the actually visit. Please   call patient back in regarding her concerns at [phone].  ---------------------------------------------------------------------------  --------------  CALL BACK INFO  What is the best way for the office to contact you? OK to leave message on   voicemail, OK to respond with electronic message via [employer] portal (only   for patients who have registered [employer] account)  Preferred Call Back Phone Number? [phone]  ---------------------------------------------------------------------------  --------------  SCRIPT ANSWERS  Relationship to Patient?  Self

## 2022-02-10 NOTE — TELEPHONE ENCOUNTER
Addressed this concern with patient. She just had question if the VV and OV visits billing differ on charges.

## 2022-12-29 ENCOUNTER — OFFICE VISIT (OUTPATIENT)
Dept: FAMILY MEDICINE CLINIC | Age: 42
End: 2022-12-29

## 2022-12-29 VITALS
SYSTOLIC BLOOD PRESSURE: 126 MMHG | DIASTOLIC BLOOD PRESSURE: 70 MMHG | TEMPERATURE: 98.2 F | WEIGHT: 293 LBS | HEART RATE: 80 BPM | HEIGHT: 68 IN | OXYGEN SATURATION: 97 % | BODY MASS INDEX: 44.41 KG/M2

## 2022-12-29 DIAGNOSIS — Z12.31 SCREENING MAMMOGRAM FOR HIGH-RISK PATIENT: ICD-10-CM

## 2022-12-29 DIAGNOSIS — F41.9 ANXIETY: Primary | ICD-10-CM

## 2022-12-29 DIAGNOSIS — F43.21 GRIEF REACTION: ICD-10-CM

## 2022-12-29 PROCEDURE — 99212 OFFICE O/P EST SF 10 MIN: CPT | Performed by: NURSE PRACTITIONER

## 2022-12-29 RX ORDER — CITALOPRAM 20 MG/1
20 TABLET ORAL DAILY
Qty: 90 TABLET | Refills: 3 | Status: SHIPPED | OUTPATIENT
Start: 2022-12-29

## 2022-12-29 RX ORDER — ALBUTEROL SULFATE 90 UG/1
1-2 AEROSOL, METERED RESPIRATORY (INHALATION) 4 TIMES DAILY PRN
Qty: 1 EACH | Refills: 0 | Status: SHIPPED | OUTPATIENT
Start: 2022-12-29

## 2022-12-29 RX ORDER — FLUTICASONE PROPIONATE 50 MCG
1 SPRAY, SUSPENSION (ML) NASAL DAILY
Qty: 1 EACH | Refills: 2 | Status: SHIPPED | OUTPATIENT
Start: 2022-12-29

## 2022-12-29 SDOH — ECONOMIC STABILITY: FOOD INSECURITY: WITHIN THE PAST 12 MONTHS, YOU WORRIED THAT YOUR FOOD WOULD RUN OUT BEFORE YOU GOT MONEY TO BUY MORE.: NEVER TRUE

## 2022-12-29 SDOH — ECONOMIC STABILITY: FOOD INSECURITY: WITHIN THE PAST 12 MONTHS, THE FOOD YOU BOUGHT JUST DIDN'T LAST AND YOU DIDN'T HAVE MONEY TO GET MORE.: NEVER TRUE

## 2022-12-29 ASSESSMENT — ENCOUNTER SYMPTOMS
NAUSEA: 0
ABDOMINAL PAIN: 0
CHEST TIGHTNESS: 0
VOMITING: 0
COUGH: 0
SHORTNESS OF BREATH: 0

## 2022-12-29 ASSESSMENT — PATIENT HEALTH QUESTIONNAIRE - PHQ9
SUM OF ALL RESPONSES TO PHQ QUESTIONS 1-9: 0
2. FEELING DOWN, DEPRESSED OR HOPELESS: 0
SUM OF ALL RESPONSES TO PHQ9 QUESTIONS 1 & 2: 0
SUM OF ALL RESPONSES TO PHQ QUESTIONS 1-9: 0
1. LITTLE INTEREST OR PLEASURE IN DOING THINGS: 0

## 2022-12-29 ASSESSMENT — SOCIAL DETERMINANTS OF HEALTH (SDOH): HOW HARD IS IT FOR YOU TO PAY FOR THE VERY BASICS LIKE FOOD, HOUSING, MEDICAL CARE, AND HEATING?: NOT HARD AT ALL

## 2022-12-29 NOTE — PROGRESS NOTES
Visit Information    Have you changed or started any medications since your last visit including any over-the-counter medicines, vitamins, or herbal medicines? no   Have you stopped taking any of your medications? Is so, why? -  no  Are you having any side effects from any of your medications? - no    Have you seen any other physician or provider since your last visit?  no   Have you had any other diagnostic tests since your last visit?  no   Have you been seen in the emergency room and/or had an admission in a hospital since we last saw you?  no   Have you had your routine dental cleaning in the past 6 months?  no     Do you have an active MyChart account? If no, what is the barrier?   Yes    Patient Care Team:  SUMMER Smith CNP as PCP - General (Nurse Practitioner)  SUMMER Smith CNP as PCP - [facility] Provider    Medical History Review  Past Medical, Family, and Social History reviewed and  contribute to the patient presenting condition    Health Maintenance   Topic Date Due    COVID-19 Vaccine (1) Never done    Varicella vaccine (1 of 2 - 2-dose childhood series) Never done    Hepatitis C screen  Never done    Lipids  03/29/2021    Flu vaccine (1) 08/01/2022    Depression Screen  12/10/2022    Cervical cancer screen  03/20/2023    DTaP/Tdap/Td vaccine (2 - Td or Tdap) 07/02/2024    HIV screen  Completed    Hepatitis A vaccine  Aged Out    Hib vaccine  Aged Out    Meningococcal (ACWY) vaccine  Aged Out    Pneumococcal 0-64 years Vaccine  Aged Out

## 2023-05-01 ENCOUNTER — PATIENT MESSAGE (OUTPATIENT)
Dept: FAMILY MEDICINE CLINIC | Age: 43
End: 2023-05-01

## 2023-05-01 DIAGNOSIS — E66.01 MORBID OBESITY WITH BMI OF 45.0-49.9, ADULT (HCC): Primary | ICD-10-CM

## 2023-05-08 RX ORDER — SEMAGLUTIDE 0.25 MG/.5ML
0.25 INJECTION, SOLUTION SUBCUTANEOUS
Qty: 2 ML | Refills: 0 | Status: SHIPPED | OUTPATIENT
Start: 2023-05-08

## 2023-10-20 ENCOUNTER — OFFICE VISIT (OUTPATIENT)
Dept: PRIMARY CARE CLINIC | Age: 43
End: 2023-10-20

## 2023-10-20 VITALS
SYSTOLIC BLOOD PRESSURE: 131 MMHG | DIASTOLIC BLOOD PRESSURE: 84 MMHG | TEMPERATURE: 98.6 F | HEART RATE: 83 BPM | OXYGEN SATURATION: 98 %

## 2023-10-20 DIAGNOSIS — H65.93 BILATERAL OTITIS MEDIA WITH EFFUSION: Primary | ICD-10-CM

## 2023-10-20 PROCEDURE — 99212 OFFICE O/P EST SF 10 MIN: CPT | Performed by: NURSE PRACTITIONER

## 2023-10-20 RX ORDER — PREDNISONE 20 MG/1
40 TABLET ORAL DAILY
Qty: 10 TABLET | Refills: 0 | Status: SHIPPED | OUTPATIENT
Start: 2023-10-20 | End: 2023-10-25

## 2023-10-20 RX ORDER — FLUTICASONE PROPIONATE 50 MCG
2 SPRAY, SUSPENSION (ML) NASAL DAILY
Qty: 32 G | Refills: 0 | Status: SHIPPED | OUTPATIENT
Start: 2023-10-20

## 2023-10-20 ASSESSMENT — ENCOUNTER SYMPTOMS
COUGH: 0
VOICE CHANGE: 0
WHEEZING: 0
SINUS PRESSURE: 0
EYE DISCHARGE: 0
SHORTNESS OF BREATH: 0
EYE REDNESS: 0
CHEST TIGHTNESS: 0
SORE THROAT: 0

## 2023-10-20 NOTE — PROGRESS NOTES
[address] [place] [address]  [address] [place] [address]  Dept: [phone]  Dept Fax: [phone]     Matty Niño is a 44 y.o. female who presents to the urgent care today for her medicalconditions/complaints as noted below. Matty Niño is c/o of Otalgia (Right ear pain, x 2 weeks )    HPI:      Otalgia   There is pain in the right ear. This is a new problem. Episode onset: 2 weeks ago. The problem has been gradually worsening. There has been no fever. Associated symptoms include hearing loss (right, different frequencies seem to be affecting ear pain/sensitivity). Pertinent negatives include no coughing, ear discharge, headaches, rash or sore throat. She has tried nothing for the symptoms. The treatment provided no relief. Past Medical History:   Diagnosis Date    Anxiety and depression     Back muscle spasm 09/11/2017    BV (bacterial vaginosis) 03/21/2018      Current Outpatient Medications   Medication Sig Dispense Refill    predniSONE (DELTASONE) 20 MG tablet Take 2 tablets by mouth daily for 5 days 10 tablet 0    fluticasone (FLONASE) 50 MCG/ACT nasal spray 2 sprays by Each Nostril route daily 32 g 0    citalopram (CELEXA) 20 MG tablet Take 1 tablet by mouth daily 90 tablet 3    albuterol sulfate HFA (PROVENTIL;VENTOLIN;PROAIR) 108 (90 Base) MCG/ACT inhaler Inhale 1-2 puffs into the lungs 4 times daily as needed for Shortness of Breath 1 each 0    fluticasone (FLONASE) 50 MCG/ACT nasal spray 1 spray by Nasal route daily 1 each 2    Semaglutide-Weight Management (WEGOVY) 0.25 MG/0.5ML SOAJ SC injection Inject 0.25 mg into the skin every 7 days (Patient not taking: Reported on 10/20/2023) 2 mL 0     No current facility-administered medications for this visit. Allergies   Allergen Reactions    Flagyl [Metronidazole]      Reviewed PMH, SH, and FH with the patient and updated.     Subjective:      Review of Systems

## 2023-12-27 ASSESSMENT — PATIENT HEALTH QUESTIONNAIRE - PHQ9
SUM OF ALL RESPONSES TO PHQ9 QUESTIONS 1 & 2: 1
1. LITTLE INTEREST OR PLEASURE IN DOING THINGS: NOT AT ALL
2. FEELING DOWN, DEPRESSED OR HOPELESS: SEVERAL DAYS

## 2023-12-29 ENCOUNTER — OFFICE VISIT (OUTPATIENT)
Dept: FAMILY MEDICINE CLINIC | Age: 43
End: 2023-12-29

## 2023-12-29 DIAGNOSIS — Z00.00 ROUTINE GENERAL MEDICAL EXAMINATION AT A HEALTH CARE FACILITY: ICD-10-CM

## 2023-12-29 DIAGNOSIS — Z13.220 LIPID SCREENING: ICD-10-CM

## 2023-12-29 DIAGNOSIS — Z13.1 DIABETES MELLITUS SCREENING: ICD-10-CM

## 2023-12-29 DIAGNOSIS — E66.01 MORBID OBESITY WITH BMI OF 45.0-49.9, ADULT (HCC): ICD-10-CM

## 2023-12-29 DIAGNOSIS — Z12.31 ENCOUNTER FOR SCREENING MAMMOGRAM FOR MALIGNANT NEOPLASM OF BREAST: ICD-10-CM

## 2023-12-29 DIAGNOSIS — F41.9 ANXIETY: Primary | ICD-10-CM

## 2023-12-29 PROBLEM — N76.0 BV (BACTERIAL VAGINOSIS): Status: RESOLVED | Noted: 2018-03-21 | Resolved: 2023-12-29

## 2023-12-29 PROBLEM — B96.89 BV (BACTERIAL VAGINOSIS): Status: RESOLVED | Noted: 2018-03-21 | Resolved: 2023-12-29

## 2023-12-29 PROBLEM — M62.830 BACK MUSCLE SPASM: Status: RESOLVED | Noted: 2017-09-11 | Resolved: 2023-12-29

## 2023-12-29 PROCEDURE — 99213 OFFICE O/P EST LOW 20 MIN: CPT | Performed by: NURSE PRACTITIONER

## 2023-12-29 PROCEDURE — G8417 CALC BMI ABV UP PARAM F/U: HCPCS | Performed by: NURSE PRACTITIONER

## 2023-12-29 NOTE — PROGRESS NOTES
Anders Odonnell, was evaluated through a synchronous (real-time) audio-video encounter. The patient (or guardian if applicable) is aware that this is a billable service, which includes applicable co-pays. This Virtual Visit was conducted with patient's (and/or legal guardian's) consent. Patient identification was verified, and a caregiver was present when appropriate. The patient was located at Home: [address] [place] [address]  Provider was located at Home ([facility]): [address] [place] [address] (:  1980) is a Established patient, presenting virtually for evaluation of the following:    Assessment & Plan   Below is the assessment and plan developed based on review of pertinent history, physical exam, labs, studies, and medications. 1. Anxiety  -     AR CALC BMI ABV UP DENISSE F/U  2. Routine general medical examination at a health care facility  -     Lipid Panel; Future  -     Comprehensive Metabolic Panel; Future  -     CBC with Auto Differential; Future  -     Hemoglobin A1C; Future  3. Lipid screening  -     Lipid Panel; Future  4. Diabetes mellitus screening  -     Hemoglobin A1C; Future  5. Morbid obesity with BMI of 45.0-49.9, adult (HCC)  -     AR CALC BMI ABV UP DENISSE F/U  -     Insulin, total; Future  -     TSH; Future  6. Encounter for screening mammogram for malignant neoplasm of breast  -     EVELYN DIGITAL SCREEN W OR WO CAD BILATERAL; Future    Return in about 6 months (around 2024) for MED CHECK, anxiety/depression recheck. Subjective   HPI  Patient calling in today for med check and refills. States she takes Celexa 10 or 20 mg depending on the season or how she is feeling for anxiety control. States this works well without any side effects. She currently is only on 10 mg and wants to see how she continues with this over the next month. She did just  a refill recently.   States she has had a lot of unexpected deaths in the family over the last 1 year but

## 2023-12-29 NOTE — PROGRESS NOTES
Visit Information    Have you changed or started any medications since your last visit including any over-the-counter medicines, vitamins, or herbal medicines? no   Have you stopped taking any of your medications? Is so, why? -  no  Are you having any side effects from any of your medications? - no    Have you seen any other physician or provider since your last visit?  no   Have you had any other diagnostic tests since your last visit?  no   Have you been seen in the emergency room and/or had an admission in a hospital since we last saw you?  no   Have you had your routine dental cleaning in the past 6 months?  no     Do you have an active MyChart account? If no, what is the barrier?   Yes    Patient Care Team:  SUMMER Wren CNP as PCP - General (Nurse Practitioner)  SUMMER Wren CNP as PCP - Empaneled Provider    Medical History Review  Past Medical, Family, and Social History reviewed and  contribute to the patient presenting condition    Health Maintenance   Topic Date Due    Hepatitis B vaccine (1 of 3 - 3-dose series) Never done    COVID-19 Vaccine (1) Never done    Varicella vaccine (1 of 2 - 2-dose childhood series) Never done    Hepatitis C screen  Never done    Diabetes screen  Never done    Lipids  03/29/2021    Cervical cancer screen  03/20/2023    Flu vaccine (1) 08/01/2023    DTaP/Tdap/Td vaccine (2 - Td or Tdap) 07/02/2024    Depression Screen  12/27/2024    HIV screen  Completed    Hepatitis A vaccine  Aged Out    Hib vaccine  Aged Out    HPV vaccine  Aged Out    Polio vaccine  Aged Out    Meningococcal (ACWY) vaccine  Aged Out    Pneumococcal 0-64 years Vaccine  Aged Out

## 2024-03-15 ENCOUNTER — TELEPHONE (OUTPATIENT)
Dept: FAMILY MEDICINE CLINIC | Age: 44
End: 2024-03-15

## 2024-03-15 LAB
ALBUMIN: 3.8 G/DL
ALK PHOSPHATASE: 69 U/L
ALT SERPL-CCNC: 19 U/L
AST SERPL-CCNC: 25 U/L
BASOPHILS ABSOLUTE: 0.04 X10^3UL
BASOPHILS RELATIVE PERCENT: 0.7 %
BILIRUB SERPL-MCNC: 0.5 MG/DL
BUN BLDV-MCNC: 13 MG/DL
CALCIUM SERPL-MCNC: 9.2 MG/DL
CHLORIDE BLD-SCNC: 108 MMOL/L
CHOLESTEROL/HDL RATIO: 2.2
CHOLESTEROL: 158 MG/DL
CO2: 22 MMOL/L
CREAT SERPL-MCNC: 0.64 MG/DL
EOSINOPHILS ABSOLUTE: 0.06 X10^3UL
EOSINOPHILS RELATIVE PERCENT: 1 %
ERYTHROCYTE [DISTWIDTH] IN BLOOD BY AUTOMATED COUNT: 43.4 FL
EST. AVERAGE GLUCOSE BLD GHB EST-MCNC: 108 MG/DL
GFR AFRICAN AMERICAN: 123.1 ML/M1.7
GFR NON-AFRICAN AMERICAN: 101.8 ML/M1.7
GLUCOSE: 86 MG/DL
HBA1C MFR BLD: 5.4 %
HCT VFR BLD CALC: 39.8 %
HDLC SERPL-MCNC: 72 MG/DL
HEMOGLOBIN: 13.2 G/DL
IMMATURE GRANULOCYTES %: 0.2 %
IMMATURE GRANULOCYTES ABSOLUTE: 0.01 X10^3UL
INSULIN: 11.7 MIU/ML
LDL CHOLESTEROL CALCULATED: 73 MG/DL
LYMPHOCYTES ABSOLUTE: 2.04 X10^3UL
LYMPHOCYTES RELATIVE PERCENT: 34.8 %
MCH RBC QN AUTO: 30.6 PG
MCHC RBC AUTO-ENTMCNC: 33.2 G/DL
MCV RBC AUTO: 92.3 FL
MONOCYTES ABSOLUTE: 0.45 X10^3UL
MONOCYTES RELATIVE PERCENT: 7.7 %
NEUTROPHILS ABSOLUTE: 3.26 X10^3UL
PLATELETS: 286 X10^3UL
POTASSIUM SERPL-SCNC: 4.4 MMOL/L
RBC: 4.31 X10^6UL
SEGMENTED NEUTROPHILS RELATIVE PERCENT: 55.6 %
SODIUM BLD-SCNC: 138 MMOL/L
TOTAL PROTEIN: 7.4 G/DL
TRIGL SERPL-MCNC: 67 MG/DL
TSH SERPL DL<=0.05 MIU/L-ACNC: 1.28 MIU/L
VLDLC SERPL CALC-MCNC: 13 MG/DL
WBC: 5.86 X10^3UL

## 2024-04-01 ENCOUNTER — PATIENT MESSAGE (OUTPATIENT)
Dept: FAMILY MEDICINE CLINIC | Age: 44
End: 2024-04-01

## 2024-04-02 NOTE — TELEPHONE ENCOUNTER
From: Nicolette Perkins  To: Debbie Oakes  Sent: 4/1/2024 8:32 PM EDT  Subject: [facility]    Hello! Doing well. Down about 13 pounds and still very little nausea. Can you please send in the next prescription?    Thank you,    Nicolette

## 2024-04-03 RX ORDER — CITALOPRAM 20 MG/1
20 TABLET ORAL DAILY
Qty: 90 TABLET | Refills: 0 | Status: SHIPPED | OUTPATIENT
Start: 2024-04-03

## 2024-04-16 ENCOUNTER — PATIENT MESSAGE (OUTPATIENT)
Dept: FAMILY MEDICINE CLINIC | Age: 44
End: 2024-04-16

## 2024-04-16 NOTE — TELEPHONE ENCOUNTER
From: Nicolette Perkins  To: Debbie Oakes  Sent: 4/16/2024 8:20 AM EDT  Subject: [facility]    Good morning.     All is still good. I'm down about 18 pounds and no nausea lately. I waited to the last minute last cycle, so I'm trying to get ahead of the ball. Do you still need them to fax a form? Every time I call and ask them to do that, they seem annoyed and tell me it's the same form they sent last time. Happy to do whatever is needed.    Thank you!

## 2024-05-13 ENCOUNTER — PATIENT MESSAGE (OUTPATIENT)
Dept: FAMILY MEDICINE CLINIC | Age: 44
End: 2024-05-13

## 2024-05-13 NOTE — TELEPHONE ENCOUNTER
From: Nicolette Perkins  To: Debbie Oakes  Sent: 5/13/2024 9:10 AM EDT  Subject: Semaglutide     Samy Lewis,    Can you please send my next prescription to [facility]? Still good. Was down about 28, but went back up 5 this week. Can't figure any reason. Only big change is that I stopped drinking my diet pop. I just dont like it anymore. Not worried. Overall doing great.    Thank you,    Nicolette  [phone]

## 2024-07-05 RX ORDER — CITALOPRAM 20 MG/1
20 TABLET ORAL DAILY
Qty: 90 TABLET | Refills: 0 | Status: SHIPPED | OUTPATIENT
Start: 2024-07-05

## 2024-10-07 RX ORDER — CITALOPRAM HYDROBROMIDE 20 MG/1
20 TABLET ORAL DAILY
Qty: 90 TABLET | Refills: 0 | Status: SHIPPED | OUTPATIENT
Start: 2024-10-07

## 2024-10-23 ENCOUNTER — PATIENT MESSAGE (OUTPATIENT)
Dept: FAMILY MEDICINE CLINIC | Age: 44
End: 2024-10-23

## 2024-11-22 ENCOUNTER — PATIENT MESSAGE (OUTPATIENT)
Dept: FAMILY MEDICINE CLINIC | Age: 44
End: 2024-11-22

## 2024-12-06 ASSESSMENT — PATIENT HEALTH QUESTIONNAIRE - PHQ9
SUM OF ALL RESPONSES TO PHQ QUESTIONS 1-9: 0
1. LITTLE INTEREST OR PLEASURE IN DOING THINGS: NOT AT ALL
1. LITTLE INTEREST OR PLEASURE IN DOING THINGS: NOT AT ALL
SUM OF ALL RESPONSES TO PHQ QUESTIONS 1-9: 0
2. FEELING DOWN, DEPRESSED OR HOPELESS: NOT AT ALL
2. FEELING DOWN, DEPRESSED OR HOPELESS: NOT AT ALL
SUM OF ALL RESPONSES TO PHQ9 QUESTIONS 1 & 2: 0
SUM OF ALL RESPONSES TO PHQ9 QUESTIONS 1 & 2: 0
SUM OF ALL RESPONSES TO PHQ QUESTIONS 1-9: 0
SUM OF ALL RESPONSES TO PHQ QUESTIONS 1-9: 0

## 2024-12-09 ENCOUNTER — OFFICE VISIT (OUTPATIENT)
Dept: FAMILY MEDICINE CLINIC | Age: 44
End: 2024-12-09

## 2024-12-09 VITALS
DIASTOLIC BLOOD PRESSURE: 74 MMHG | HEIGHT: 68 IN | OXYGEN SATURATION: 98 % | BODY MASS INDEX: 39.68 KG/M2 | SYSTOLIC BLOOD PRESSURE: 126 MMHG | HEART RATE: 85 BPM | TEMPERATURE: 98 F | WEIGHT: 261.8 LBS

## 2024-12-09 DIAGNOSIS — E66.812 CLASS 2 OBESITY WITHOUT SERIOUS COMORBIDITY WITH BODY MASS INDEX (BMI) OF 39.0 TO 39.9 IN ADULT, UNSPECIFIED OBESITY TYPE: ICD-10-CM

## 2024-12-09 DIAGNOSIS — M25.552 BILATERAL HIP PAIN: ICD-10-CM

## 2024-12-09 DIAGNOSIS — M25.551 BILATERAL HIP PAIN: ICD-10-CM

## 2024-12-09 DIAGNOSIS — F41.9 ANXIETY: Primary | ICD-10-CM

## 2024-12-09 DIAGNOSIS — Z87.768 PERSONAL HISTORY OF CONGENITAL HIP DYSPLASIA: ICD-10-CM

## 2024-12-09 DIAGNOSIS — Z12.31 ENCOUNTER FOR SCREENING MAMMOGRAM FOR MALIGNANT NEOPLASM OF BREAST: ICD-10-CM

## 2024-12-09 DIAGNOSIS — L98.9 SKIN LESION: ICD-10-CM

## 2024-12-09 PROCEDURE — 99213 OFFICE O/P EST LOW 20 MIN: CPT | Performed by: NURSE PRACTITIONER

## 2024-12-09 RX ORDER — CITALOPRAM HYDROBROMIDE 20 MG/1
20 TABLET ORAL DAILY
Qty: 90 TABLET | Refills: 0 | Status: SHIPPED | OUTPATIENT
Start: 2024-12-09

## 2024-12-09 ASSESSMENT — ENCOUNTER SYMPTOMS
DIARRHEA: 1
ABDOMINAL PAIN: 0
SHORTNESS OF BREATH: 0
CHEST TIGHTNESS: 0
COUGH: 0
ALLERGIC/IMMUNOLOGIC NEGATIVE: 1
VOMITING: 0
NAUSEA: 1
BACK PAIN: 0
COLOR CHANGE: 1
CONSTIPATION: 0
BLOOD IN STOOL: 0

## 2024-12-09 NOTE — PROGRESS NOTES
Visit Information    Have you changed or started any medications since your last visit including any over-the-counter medicines, vitamins, or herbal medicines? no   Have you stopped taking any of your medications? Is so, why? -  no  Are you having any side effects from any of your medications? - no    Have you seen any other physician or provider since your last visit?  no   Have you had any other diagnostic tests since your last visit?  no   Have you been seen in the emergency room and/or had an admission in a hospital since we last saw you?  no   Have you had your routine dental cleaning in the past 6 months?  no     Do you have an active MyChart account? If no, what is the barrier?  Yes    Patient Care Team:  Debbie Oakes APRN - CNP as PCP - General (Nurse Practitioner)  Debbie Oakes APRN - CNP as PCP - Empaneled Provider    Medical History Review  Past Medical, Family, and Social History reviewed and  contribute to the patient presenting condition    Health Maintenance   Topic Date Due    Varicella vaccine (1 of 2 - 13+ 2-dose series) Never done    Hepatitis C screen  Never done    Hepatitis B vaccine (1 of 3 - 19+ 3-dose series) Never done    Breast cancer screen  Never done    Cervical cancer screen  03/20/2023    DTaP/Tdap/Td vaccine (2 - Td or Tdap) 07/02/2024    Flu vaccine (1) 08/01/2024    COVID-19 Vaccine (1 - 2023-24 season) Never done    Depression Screen  12/06/2025    Lipids  03/15/2029    HIV screen  Completed    Hepatitis A vaccine  Aged Out    Hib vaccine  Aged Out    HPV vaccine  Aged Out    Polio vaccine  Aged Out    Meningococcal (ACWY) vaccine  Aged Out    Pneumococcal 0-64 years Vaccine  Aged Out    Diabetes screen  Discontinued             
SUMMER Jones CNP     Requested Specialty:   Nurse Practitioner     Number of Visits Requested:   1     Orders Placed This Encounter   Medications    DISCONTD: Semaglutide-Weight Management (WEGOVY) 2.4 MG/0.75ML SOAJ SC injection     Sig: Inject 2.4 mg into the skin every 7 days     Dispense:  2 mL     Refill:  0     Ok for equivalent dose per compounding    citalopram (CELEXA) 20 MG tablet     Sig: Take 1 tablet by mouth daily     Dispense:  90 tablet     Refill:  0    Semaglutide-Weight Management (WEGOVY) 2.4 MG/0.75ML SOAJ SC injection     Sig: Inject 2.4 mg into the skin every 7 days     Dispense:  2 mL     Refill:  5     Ok for equivalent dose per compounding       Patient given educational materials - see patient instructions.  Discussed use,benefit, and side effects of prescribed medications.  All patient questions answered.Pt voiced understanding. Reviewed health maintenance.  Instructed to continue currentmedications, diet and exercise.    Electronically signed by SUMMER Sprague CNP,CNP on 12/9/2024 at 11:08 AM

## 2025-01-23 SDOH — HEALTH STABILITY: PHYSICAL HEALTH: ON AVERAGE, HOW MANY DAYS PER WEEK DO YOU ENGAGE IN MODERATE TO STRENUOUS EXERCISE (LIKE A BRISK WALK)?: 0 DAYS

## 2025-01-24 ENCOUNTER — OFFICE VISIT (OUTPATIENT)
Dept: ORTHOPEDIC SURGERY | Age: 45
End: 2025-01-24
Payer: COMMERCIAL

## 2025-01-24 VITALS — BODY MASS INDEX: 39.56 KG/M2 | WEIGHT: 261 LBS | HEIGHT: 68 IN

## 2025-01-24 DIAGNOSIS — M25.552 BILATERAL HIP PAIN: Primary | ICD-10-CM

## 2025-01-24 DIAGNOSIS — M25.551 BILATERAL HIP PAIN: Primary | ICD-10-CM

## 2025-01-24 DIAGNOSIS — Q65.89 CONGENITAL HIP DYSPLASIA: ICD-10-CM

## 2025-01-24 DIAGNOSIS — Q65.4 BILATERAL CONGENITAL SUBLUXATION OF HIP: ICD-10-CM

## 2025-01-24 PROCEDURE — 99203 OFFICE O/P NEW LOW 30 MIN: CPT | Performed by: PHYSICIAN ASSISTANT

## 2025-01-24 NOTE — PROGRESS NOTES
[facility] PHYSICIANS [facility], [facility] ORTHOPEDICS AND SPORTS MEDICINE  [address] [facility]  [address] [place] [address]  Dept: [phone]    Ambulatory Orthopedic New Patient Visit      CHIEF COMPLAINT:    Chief Complaint   Patient presents with    Hip Pain     Bilateral hip pain       HISTORY OF PRESENT ILLNESS:      History of Present Illness  The patient is a 44-year-old female who presents today for bilateral hip pain. She is a new patient to our office and notes that she has had issues with her hips since birth when she had hip dysplasia as a young child.    She reports a history of hip dysplasia since birth, with a recent exacerbation of pain over the past 6 months. The pain, which was previously associated with strenuous activities such as running 6 miles, now occurs daily even without physical exertion.     Prolonged sitting also triggers discomfort. The pain is predominantly localized in the deep groin area, with the left hip being more severely affected than the right. She also reports a long-standing ability to dislocate her hips at will, a sensation she can still induce.     Currently, she experiences a dull ache, which she manages without medication but through lifestyle modifications. She has reduced her participation in dance routines due to the repetitive strain it places on her hips. She also reports pain upon standing from a seated position. Prolonged sitting also triggers discomfort.    She denies prior injury or surgery to the bilateral hip.      Past Medical History:        Past Medical History:   Diagnosis Date    Anxiety and depression     Back muscle spasm 2017    BV (bacterial vaginosis) 2018       Past Surgical History:    Past Surgical History:   Procedure Laterality Date     SECTION      x2    OTHER SURGICAL HISTORY      mole removal       Current Medications:   Current Outpatient Medications   Medication Sig Dispense

## 2025-02-10 ENCOUNTER — HOSPITAL ENCOUNTER (OUTPATIENT)
Dept: INTERVENTIONAL RADIOLOGY/VASCULAR | Age: 45
Discharge: HOME | End: 2025-02-12
Payer: COMMERCIAL

## 2025-02-10 ENCOUNTER — HOSPITAL ENCOUNTER (OUTPATIENT)
Dept: MRI IMAGING | Age: 45
Discharge: HOME | End: 2025-02-12
Payer: COMMERCIAL

## 2025-02-10 VITALS — SYSTOLIC BLOOD PRESSURE: 139 MMHG | RESPIRATION RATE: 16 BRPM | HEART RATE: 84 BPM | DIASTOLIC BLOOD PRESSURE: 75 MMHG

## 2025-02-10 DIAGNOSIS — M25.551 BILATERAL HIP PAIN: ICD-10-CM

## 2025-02-10 DIAGNOSIS — M25.552 BILATERAL HIP PAIN: ICD-10-CM

## 2025-02-10 DIAGNOSIS — Q65.4 BILATERAL CONGENITAL SUBLUXATION OF HIP: ICD-10-CM

## 2025-02-10 DIAGNOSIS — Q65.89 CONGENITAL HIP DYSPLASIA: ICD-10-CM

## 2025-02-10 PROCEDURE — 77002 NEEDLE LOCALIZATION BY XRAY: CPT

## 2025-02-10 PROCEDURE — 2709999900 IR ARTHR/ASP/INJ MAJOR JT/BURSA RIGHT WO US

## 2025-02-10 PROCEDURE — 2709999900 IR ARTHR/ASP/INJ MAJOR JT/BURSA LEFT WO US

## 2025-02-10 PROCEDURE — A9579 GAD-BASE MR CONTRAST NOS,1ML: HCPCS | Performed by: RADIOLOGY

## 2025-02-10 PROCEDURE — 73722 MRI JOINT OF LWR EXTR W/DYE: CPT

## 2025-02-10 PROCEDURE — 6360000004 HC RX CONTRAST MEDICATION: Performed by: RADIOLOGY

## 2025-02-10 PROCEDURE — 20610 DRAIN/INJ JOINT/BURSA W/O US: CPT

## 2025-02-10 RX ORDER — IOPAMIDOL 612 MG/ML
12 INJECTION, SOLUTION INTRAVASCULAR ONCE
Status: COMPLETED | OUTPATIENT
Start: 2025-02-10 | End: 2025-02-10

## 2025-02-10 RX ORDER — IOPAMIDOL 612 MG/ML
12 INJECTION, SOLUTION INTRAVASCULAR
Status: DISCONTINUED | OUTPATIENT
Start: 2025-02-10 | End: 2025-02-10

## 2025-02-10 RX ADMIN — IOPAMIDOL 12 ML: 612 INJECTION, SOLUTION INTRAVENOUS at 11:27

## 2025-02-10 RX ADMIN — IOPAMIDOL 12 ML: 612 INJECTION, SOLUTION INTRAVENOUS at 11:16

## 2025-02-10 RX ADMIN — GADOTERIDOL 5 MMOL: 279.3 INJECTION, SOLUTION INTRAVENOUS at 11:27

## 2025-02-10 RX ADMIN — GADOTERIDOL 5 MMOL: 279.3 INJECTION, SOLUTION INTRAVENOUS at 11:16

## 2025-02-11 ENCOUNTER — PATIENT MESSAGE (OUTPATIENT)
Dept: ORTHOPEDIC SURGERY | Age: 45
End: 2025-02-11

## 2025-02-11 DIAGNOSIS — M25.552 BILATERAL HIP PAIN: Primary | ICD-10-CM

## 2025-02-11 DIAGNOSIS — M25.551 BILATERAL HIP PAIN: Primary | ICD-10-CM

## 2025-02-11 DIAGNOSIS — N83.202 CYST OF LEFT OVARY: Primary | ICD-10-CM

## 2025-02-12 NOTE — TELEPHONE ENCOUNTER
Below is the response to the patient's [employer] message.    \"Nicolette,     I reviewed your MRIs and the findings are consistent with normal wear and tear(degeneration) of the labrum within both hips. Initially id recommend that you do formal PT for 6-8 weeks to see if increasing your strength can help with the discomfort. If it does not help, we can get you to see one of our hip specialists.      Are you ok with this? If so let me know where you'd like to start PT and we can place a referral accordingly.     Shira Capps PA-C\"

## 2025-02-14 ENCOUNTER — HOSPITAL ENCOUNTER (OUTPATIENT)
Dept: ULTRASOUND IMAGING | Age: 45
Discharge: HOME | End: 2025-02-16
Payer: COMMERCIAL

## 2025-02-14 DIAGNOSIS — N83.202 CYST OF LEFT OVARY: ICD-10-CM

## 2025-02-14 PROCEDURE — 76830 TRANSVAGINAL US NON-OB: CPT

## 2025-02-14 PROCEDURE — 76856 US EXAM PELVIC COMPLETE: CPT

## 2025-02-21 ENCOUNTER — HOSPITAL ENCOUNTER (OUTPATIENT)
Dept: MAMMOGRAPHY | Age: 45
Discharge: HOME | End: 2025-02-23
Payer: COMMERCIAL

## 2025-02-21 VITALS — BODY MASS INDEX: 39.56 KG/M2 | HEIGHT: 68 IN | WEIGHT: 261 LBS

## 2025-02-21 DIAGNOSIS — Z12.31 ENCOUNTER FOR SCREENING MAMMOGRAM FOR MALIGNANT NEOPLASM OF BREAST: ICD-10-CM

## 2025-02-21 PROCEDURE — 77063 BREAST TOMOSYNTHESIS BI: CPT

## 2025-03-03 ENCOUNTER — HOSPITAL ENCOUNTER (OUTPATIENT)
Age: 45
Setting detail: SPECIMEN
Discharge: HOME | End: 2025-03-03

## 2025-03-03 ENCOUNTER — OFFICE VISIT (OUTPATIENT)
Dept: FAMILY MEDICINE CLINIC | Age: 45
End: 2025-03-03
Payer: COMMERCIAL

## 2025-03-03 VITALS
SYSTOLIC BLOOD PRESSURE: 108 MMHG | TEMPERATURE: 96.9 F | HEIGHT: 68 IN | BODY MASS INDEX: 36.98 KG/M2 | HEART RATE: 89 BPM | WEIGHT: 244 LBS | DIASTOLIC BLOOD PRESSURE: 76 MMHG | OXYGEN SATURATION: 98 %

## 2025-03-03 DIAGNOSIS — Z12.4 CERVICAL CANCER SCREENING: Primary | ICD-10-CM

## 2025-03-03 PROCEDURE — 99396 PREV VISIT EST AGE 40-64: CPT | Performed by: NURSE PRACTITIONER

## 2025-03-03 SDOH — ECONOMIC STABILITY: FOOD INSECURITY: WITHIN THE PAST 12 MONTHS, THE FOOD YOU BOUGHT JUST DIDN'T LAST AND YOU DIDN'T HAVE MONEY TO GET MORE.: NEVER TRUE

## 2025-03-03 SDOH — ECONOMIC STABILITY: FOOD INSECURITY: WITHIN THE PAST 12 MONTHS, YOU WORRIED THAT YOUR FOOD WOULD RUN OUT BEFORE YOU GOT MONEY TO BUY MORE.: NEVER TRUE

## 2025-03-03 ASSESSMENT — PATIENT HEALTH QUESTIONNAIRE - PHQ9
SUM OF ALL RESPONSES TO PHQ QUESTIONS 1-9: 2
SUM OF ALL RESPONSES TO PHQ QUESTIONS 1-9: 2
1. LITTLE INTEREST OR PLEASURE IN DOING THINGS: SEVERAL DAYS
SUM OF ALL RESPONSES TO PHQ QUESTIONS 1-9: 2
SUM OF ALL RESPONSES TO PHQ QUESTIONS 1-9: 2
2. FEELING DOWN, DEPRESSED OR HOPELESS: SEVERAL DAYS

## 2025-03-03 NOTE — PROGRESS NOTES
[facility]  [address] [place] [address]  [phone]      Nicolette Perkins is a 44 y.o. female who presents today for her  medical conditions/complaints as noted below.  Nicolette Perkins is c/o of Gynecologic Exam  .    HPI:     HPI    GYN History:  Menstrual Hx: regular        DOLP:25  ST1 Hx: HPV possibly   Ectopic pregnancy Hx:none  Menarche:12  Cycle every 30 days  Duration of Cycle: 5 days   Dysmenorrhea:  Mild day 2  Sexually Active:  yes  Dyspareunia: no    Mamm 2025 and WNL    Past Medical History:   Diagnosis Date    Anxiety and depression     Back muscle spasm 2017    BV (bacterial vaginosis) 2018      Past Surgical History:   Procedure Laterality Date     SECTION      x2    OTHER SURGICAL HISTORY      mole removal     Family History   Problem Relation Age of Onset    Hypertension Mother     Other Mother         anxiety    High Blood Pressure Mother     Obesity Mother     No Known Problems Father     Suicide Sister     Other Maternal Grandfather         anxiety    Hypertension Maternal Grandfather     Hearing Loss Maternal Grandfather     High Blood Pressure Maternal Grandfather     Kidney Disease Maternal Grandfather     Obesity Maternal Uncle      Social History     Tobacco Use    Smoking status: Never    Smokeless tobacco: Never   Substance Use Topics    Alcohol use: Not Currently     Comment: Have not had a drink in 6.5 years      Current Outpatient Medications   Medication Sig Dispense Refill    citalopram (CELEXA) 20 MG tablet Take 1 tablet by mouth daily 90 tablet 0    Semaglutide-Weight Management (WEGOVY) 2.4 MG/0.75ML SOAJ SC injection Inject 2.4 mg into the skin every 7 days 2 mL 5    albuterol sulfate HFA (PROVENTIL;VENTOLIN;PROAIR) 108 (90 Base) MCG/ACT inhaler Inhale 1-2 puffs into the lungs 4 times daily as needed for Shortness of Breath 1 each 0    fluticasone (FLONASE) 50 MCG/ACT nasal spray 1 spray by Nasal route

## 2025-03-05 LAB
HPV I/H RISK 4 DNA CVX QL NAA+PROBE: NOT DETECTED
HPV SAMPLE: NORMAL
HPV, INTERPRETATION: NORMAL
HPV16 DNA CVX QL NAA+PROBE: NOT DETECTED
HPV18 DNA CVX QL NAA+PROBE: NOT DETECTED
SPECIMEN DESCRIPTION: NORMAL

## 2025-03-08 LAB — CYTOLOGY REPORT: NORMAL

## 2025-03-09 ENCOUNTER — RESULTS FOLLOW-UP (OUTPATIENT)
Dept: LAB | Age: 45
End: 2025-03-09

## 2025-03-09 ENCOUNTER — PATIENT MESSAGE (OUTPATIENT)
Dept: FAMILY MEDICINE CLINIC | Age: 45
End: 2025-03-09

## 2025-03-09 DIAGNOSIS — E66.812 CLASS 2 OBESITY WITHOUT SERIOUS COMORBIDITY WITH BODY MASS INDEX (BMI) OF 39.0 TO 39.9 IN ADULT, UNSPECIFIED OBESITY TYPE: Primary | ICD-10-CM

## 2025-03-14 ENCOUNTER — TELEPHONE (OUTPATIENT)
Dept: FAMILY MEDICINE CLINIC | Age: 45
End: 2025-03-14

## 2025-04-12 DIAGNOSIS — E66.812 CLASS 2 OBESITY WITHOUT SERIOUS COMORBIDITY WITH BODY MASS INDEX (BMI) OF 39.0 TO 39.9 IN ADULT, UNSPECIFIED OBESITY TYPE: ICD-10-CM

## 2025-04-13 RX ORDER — SEMAGLUTIDE 2.4 MG/.75ML
INJECTION, SOLUTION SUBCUTANEOUS
Qty: 3 ML | Refills: 0 | Status: SHIPPED | OUTPATIENT
Start: 2025-04-13

## 2025-04-17 DIAGNOSIS — F41.9 ANXIETY: ICD-10-CM

## 2025-04-18 RX ORDER — CITALOPRAM HYDROBROMIDE 20 MG/1
20 TABLET ORAL DAILY
Qty: 90 TABLET | Refills: 0 | Status: SHIPPED | OUTPATIENT
Start: 2025-04-18

## 2025-04-29 ENCOUNTER — PATIENT MESSAGE (OUTPATIENT)
Dept: FAMILY MEDICINE CLINIC | Age: 45
End: 2025-04-29

## 2025-06-20 ENCOUNTER — PATIENT MESSAGE (OUTPATIENT)
Dept: FAMILY MEDICINE CLINIC | Age: 45
End: 2025-06-20

## 2025-06-20 RX ORDER — CITALOPRAM HYDROBROMIDE 10 MG/1
10 TABLET ORAL DAILY
Qty: 30 TABLET | Refills: 3 | Status: SHIPPED | OUTPATIENT
Start: 2025-06-20

## 2025-07-20 RX ORDER — SEMAGLUTIDE 1.7 MG/.75ML
INJECTION, SOLUTION SUBCUTANEOUS
Qty: 3 ML | Refills: 0 | Status: SHIPPED | OUTPATIENT
Start: 2025-07-20

## 2025-08-08 ENCOUNTER — PATIENT MESSAGE (OUTPATIENT)
Dept: FAMILY MEDICINE CLINIC | Age: 45
End: 2025-08-08

## 2025-08-18 RX ORDER — SEMAGLUTIDE 1.7 MG/.75ML
INJECTION, SOLUTION SUBCUTANEOUS
Qty: 3 ML | Refills: 0 | Status: SHIPPED | OUTPATIENT
Start: 2025-08-18